# Patient Record
Sex: MALE | Race: WHITE | Employment: FULL TIME | URBAN - METROPOLITAN AREA
[De-identification: names, ages, dates, MRNs, and addresses within clinical notes are randomized per-mention and may not be internally consistent; named-entity substitution may affect disease eponyms.]

---

## 2022-01-12 ENCOUNTER — OFFICE VISIT (OUTPATIENT)
Dept: PODIATRY | Facility: CLINIC | Age: 27
End: 2022-01-12
Payer: COMMERCIAL

## 2022-01-12 ENCOUNTER — ANCILLARY PROCEDURE (OUTPATIENT)
Dept: GENERAL RADIOLOGY | Facility: CLINIC | Age: 27
End: 2022-01-12
Attending: PODIATRIST
Payer: COMMERCIAL

## 2022-01-12 VITALS
WEIGHT: 194 LBS | SYSTOLIC BLOOD PRESSURE: 155 MMHG | HEIGHT: 75 IN | BODY MASS INDEX: 24.12 KG/M2 | RESPIRATION RATE: 16 BRPM | HEART RATE: 101 BPM | DIASTOLIC BLOOD PRESSURE: 99 MMHG

## 2022-01-12 DIAGNOSIS — M25.371 ANKLE INSTABILITY, RIGHT: Primary | ICD-10-CM

## 2022-01-12 DIAGNOSIS — Q68.8 OS TRIGONUM SYNDROME: ICD-10-CM

## 2022-01-12 DIAGNOSIS — M25.871 IMPINGEMENT SYNDROME OF RIGHT ANKLE: ICD-10-CM

## 2022-01-12 DIAGNOSIS — M25.571 RIGHT ANKLE PAIN, UNSPECIFIED CHRONICITY: ICD-10-CM

## 2022-01-12 PROCEDURE — 99203 OFFICE O/P NEW LOW 30 MIN: CPT | Performed by: PODIATRIST

## 2022-01-12 PROCEDURE — 73610 X-RAY EXAM OF ANKLE: CPT | Mod: 26 | Performed by: RADIOLOGY

## 2022-01-12 ASSESSMENT — MIFFLIN-ST. JEOR: SCORE: 1935.61

## 2022-01-12 NOTE — PATIENT INSTRUCTIONS
"PATIENT INSTRUCTIONS    Physical Therapy  You have been referred to [facility].  Please call to schedule an appointment:  [phone].      Use ankle brace or compression sleeve when active.  If the ankle flares up, can perform a steroid (\"cortisone\") injection in the joint.    MRI if symptoms worsen    Activity to tolerance; don't have to avoid    NSAID - call for Rx    "

## 2022-01-12 NOTE — LETTER
1/12/2022         RE: Solitario Puckett  [address]  [place] [address]        Dear Colleague,    Thank you for referring your patient, Solitario Puckett, to the [facility]. Please see a copy of my visit note below.    Imaging:   x-ray  Weight-bearing views right ankle dated 1/12/21, reveal anterior tibiotalar spurring, os trigonum posteriorly, but no significant joint space narrowing.  Symmetric mortise without diastasis or tilt.  ST calcification (mild) w/in deltoid medially.  Entire foot on lateral was not captured as ordered but RF appears neutral to mildly pronated.      Assessment:      ICD-10-CM    1. Ankle instability, right  M25.371 XR Ankle Right G/E 3 Views   2. Impingement syndrome of right ankle  M25.871    3. Os trigonum syndrome  Q68.8           Plan:  Orders Placed This Encounter   Procedures     XR Ankle Right G/E 3 Views       Discussed the etiology and treatment of the condition with the patient.  Imaging studies reviewed and discussed with the patient.  Discussed surgical and conservative options.  Chronic ankle instability; symptoms in lateral gutter currently, likley some ST impingemen from chronic sprains.  Anterior osseous impingement present, only mild tenderness.  Pain at posterior ankle w/ FHL motion.  Several areas of potential pain but lateral ankle most symptomatic currently.    Discussed PT/brace vs MRI or injection.  PT elects for initial PT.  HE has a brace at home from prior sprains.    Discussed MRI or diagnostic/therapeutic ankle joint injection if needed.    -NSAID- call if diclofenac desired, pt declines today  -Brace- ASO.   -PT- referral  -Activity - to tolerance      Return:  Return if symptoms worsen or fail to improve, for R ankle instability.                Chief Complaint:     Patient presents with:  Consult For: Right ankle pain.     right ankle pain    HPI:  Solitario Puckett is a 26 year old year old male who presents for evaluation of ankle  "pain.      Prior ankle sprains x3 very badly on R; minor instability events on L.    Has also sprained L.  Points to lateral gutter, and posteromedial ankle as areas of max pain.  States he has brace at home he has used previously.  Stopped using because too uncomfortable.  Play basketball once weekly currently (rec).    Past Medical & Surgical History:  History reviewed. No pertinent past medical history.   History reviewed. No pertinent surgical history.   History reviewed. No pertinent family history.     Social History:  ?  History   Smoking Status     Not on file   Smokeless Tobacco     Not on file     History   Drug Use Not on file     Social History    Substance and Sexual Activity      Alcohol use: Not on file      Allergies:  ?   Not on File     Medications:    No current outpatient medications on file.     No current facility-administered medications for this visit.         Physical Exam:  ?  Vitals:  BP (!) 155/99   Pulse 101   Resp 16   Ht 1.905 m (6' 3\")   Wt 88 kg (194 lb)   BMI 24.25 kg/m     General:  WD/WN, in NAD.  A&O x3.  Dermatologic:    Skin is intact, open lesions absent.   Skin texture, turgor is normal.  Vascular:  Pulses palpable bilateral.  Digital capillary refill time normal bilateral.  Skin temperature is normal bilateral.  Generalized edema- none bilateral.  Focal edema- mild lateral gutter right.  Neurologic:    Gross sensation normal.  Gait and balance normal.  Musculoskeletal:  Maximal pain to palpation of lateral gutter right.  Mild pain to palpation medial gutter, right.  No pain to palpation of anterior joint space, peroneals, sinus tarsi, R  Ankle ROM full, osseous end point in DF, not painful, and PF, mildly painful.  Muscle strength FHL 5/5  prodcues posterior ankle pain, right.    Anterior drawer unstable, pain free, right.  Talar tilt incrased, but equal to Left, pain free, right  External rotation stress not painful, right      Stance:  RCSP neutral to valgus " bilateral.        Elma Biggs DPM            Again, thank you for allowing me to participate in the care of your patient.        Sincerely,        Elma Biggs DPM

## 2022-01-12 NOTE — PROGRESS NOTES
Imaging:   x-ray  Weight-bearing views right ankle dated 1/12/21, reveal anterior tibiotalar spurring, os trigonum posteriorly, but no significant joint space narrowing.  Symmetric mortise without diastasis or tilt.  ST calcification (mild) w/in deltoid medially.  Entire foot on lateral was not captured as ordered but RF appears neutral to mildly pronated.      Assessment:      ICD-10-CM    1. Ankle instability, right  M25.371 XR Ankle Right G/E 3 Views   2. Impingement syndrome of right ankle  M25.871    3. Os trigonum syndrome  Q68.8           Plan:  Orders Placed This Encounter   Procedures     XR Ankle Right G/E 3 Views       Discussed the etiology and treatment of the condition with the patient.  Imaging studies reviewed and discussed with the patient.  Discussed surgical and conservative options.  Chronic ankle instability; symptoms in lateral gutter currently, likley some ST impingemen from chronic sprains.  Anterior osseous impingement present, only mild tenderness.  Pain at posterior ankle w/ FHL motion.  Several areas of potential pain but lateral ankle most symptomatic currently.    Discussed PT/brace vs MRI or injection.  PT elects for initial PT.  HE has a brace at home from prior sprains.    Discussed MRI or diagnostic/therapeutic ankle joint injection if needed.    -NSAID- call if diclofenac desired, pt declines today  -Brace- ASO.   -PT- referral  -Activity - to tolerance      Return:  Return if symptoms worsen or fail to improve, for R ankle instability.                Chief Complaint:     Patient presents with:  Consult For: Right ankle pain.     right ankle pain    HPI:  Solitario Puckett is a 26 year old year old male who presents for evaluation of ankle pain.      Prior ankle sprains x3 very badly on R; minor instability events on L.    Has also sprained L.  Points to lateral gutter, and posteromedial ankle as areas of max pain.  States he has brace at home he has used previously.  Stopped using  "because too uncomfortable.  Play basketball once weekly currently (rec).    Past Medical & Surgical History:  History reviewed. No pertinent past medical history.   History reviewed. No pertinent surgical history.   History reviewed. No pertinent family history.     Social History:  ?  History   Smoking Status     Not on file   Smokeless Tobacco     Not on file     History   Drug Use Not on file     Social History    Substance and Sexual Activity      Alcohol use: Not on file      Allergies:  ?   Not on File     Medications:    No current outpatient medications on file.     No current facility-administered medications for this visit.         Physical Exam:  ?  Vitals:  BP (!) 155/99   Pulse 101   Resp 16   Ht 1.905 m (6' 3\")   Wt 88 kg (194 lb)   BMI 24.25 kg/m     General:  WD/WN, in NAD.  A&O x3.  Dermatologic:    Skin is intact, open lesions absent.   Skin texture, turgor is normal.  Vascular:  Pulses palpable bilateral.  Digital capillary refill time normal bilateral.  Skin temperature is normal bilateral.  Generalized edema- none bilateral.  Focal edema- mild lateral gutter right.  Neurologic:    Gross sensation normal.  Gait and balance normal.  Musculoskeletal:  Maximal pain to palpation of lateral gutter right.  Mild pain to palpation medial gutter, right.  No pain to palpation of anterior joint space, peroneals, sinus tarsi, R  Ankle ROM full, osseous end point in DF, not painful, and PF, mildly painful.  Muscle strength FHL 5/5  prodcues posterior ankle pain, right.    Anterior drawer unstable, pain free, right.  Talar tilt incrased, but equal to Left, pain free, right  External rotation stress not painful, right      Stance:  RCSP neutral to valgus bilateral.        Elma Biggs DPM        "

## 2022-01-17 ENCOUNTER — THERAPY VISIT (OUTPATIENT)
Dept: PHYSICAL THERAPY | Facility: CLINIC | Age: 27
End: 2022-01-17
Payer: COMMERCIAL

## 2022-01-17 DIAGNOSIS — M25.871 IMPINGEMENT SYNDROME OF RIGHT ANKLE: ICD-10-CM

## 2022-01-17 DIAGNOSIS — M25.371 ANKLE INSTABILITY, RIGHT: ICD-10-CM

## 2022-01-17 DIAGNOSIS — Q68.8 OS TRIGONUM SYNDROME: ICD-10-CM

## 2022-01-17 PROCEDURE — 97112 NEUROMUSCULAR REEDUCATION: CPT | Mod: GP | Performed by: PHYSICAL THERAPIST

## 2022-01-17 PROCEDURE — 97161 PT EVAL LOW COMPLEX 20 MIN: CPT | Mod: GP | Performed by: PHYSICAL THERAPIST

## 2022-01-17 PROCEDURE — 97110 THERAPEUTIC EXERCISES: CPT | Mod: GP | Performed by: PHYSICAL THERAPIST

## 2022-01-17 ASSESSMENT — ACTIVITIES OF DAILY LIVING (ADL)
HIGHEST_POTENTIAL_ADL_SCORE:: 84
TOTAL_ADL_ITEM_SCORE:: 60
ACTIVITIES_OF_DAILY_LIVING_MEASURE_SCORE(%):: 71.43

## 2022-01-17 NOTE — PROGRESS NOTES
Physical Therapy Initial Evaluation  Subjective:  The history is provided by the patient.   Patient Health History  Solitario Puckett being seen for ankle pain, instability due to old injury .     Problem began: 1/1/2014.   Problem occurred: basketball sprains    Pain is reported as 4/10 (brief 8/10 ) on pain scale.  General health as reported by patient is good.  Pertinent medical history includes: asthma.   Red flags:  None as reported by patient.         Current medications:  None.    Current occupation is  .   Primary job tasks include:  Prolonged sitting and computer work.                  Therapist Generated HPI Evaluation  Problem details: Pt reports several sights of pain in his Rt ankle, w/ episodes of giving out/locking up, swelling and instability. Xrays reveal impingement of hte peroneals, spurring and instab.  He is a regular  but has stopped due to pain. He admits spraining his ankle, both, several times but has done no rehab/PT.      .         Type of problem:  Right ankle.    This is a chronic condition.  Condition occurred with:  A fall/slip, a twist, a wrong landing and a jump.  Where condition occurred: during recreation/sport.  Patient reports pain:  Anterior, lateral, joint and posterior.  Pain is described as aching, stabbing and sharp and is intermittent.  Pain radiates to:  No radiation. Pain is the same all the time.  Since onset symptoms are gradually worsening.  Associated symptoms:  Buckling/giving out, edema, loss of motion/stiffness, loss of strength and catching. Symptoms are exacerbated by running, bending/squatting and walking  and relieved by nothing.  Special tests included:  X-ray.    Restrictions due to condition include:  Working in normal job without restrictions.  Barriers include:  None as reported by patient.                        Objective:  System    Ankle/Foot Evaluation  ROM:    AROM:    Dorsiflexion: Left:    Right:   7  Plantarflexion: Left:      Right:  Norm  Inversion: Left:      Right:  +45 hyper   Eversion:     Right:  +30       PROM:    Dorsiflexion: Left:        Right:   8   Plantarflexion: Left:        Right:  N  Inversion: Left:          Right:   50  Eversion: Left:      Right:  32      Pain: ++TTP peroneals, Tib/fib ant and post     Strength:    Dorsiflexion:  Right: 4+/5   Pain:  Plantarflexion: Right: 5/5  Pain:  Inversion:Right: 4/5  Pain:  Eversion:Right: 5-/5  Pain:      Anterior Tibialis:Right: 4/5  Pain:  Posterior Tibialis: Right: 4/5  Pain:  Peroneals: Right: 4+/5  Pain:        LIGAMENT TESTING:   Anterior Drawer (ATF) Right: Gr I  Posterior Drawer (PTF) Right: Gr I  Varus Stress (Calc Fib) Right: Gr I  Valgus Stress (Deltoid) Right: Gr II    External Rotation (High Ankle) Right: Gr I  SPECIAL TESTS: not assessed    PALPATION:     Right ankle tenderness present at:   anterior talofibular ligament; posterior talofibular ligament; medial malleolus; lateral malleolus; anterior tibiofibular ligament and posterior tibiofibular ligament  EDEMA: normal          MOBILITY TESTING:     Tib-Fib Distal Right: hypermobile  Talocrural Right: hypermobile  Subtalar Right: normal  Midtarsal Right: normal  First Ray Right: normal  FUNCTIONAL TESTS: Functional test ankle: +TF instab, better when wrapped                                                               General     ROS    Assessment/Plan:    Patient is a 26 year old male with right side ankle complaints.    Patient has the following significant findings with corresponding treatment plan.                Diagnosis 1:  Rt ankle instabilty  Pain -  hot/cold therapy, manual therapy, splint/taping/bracing/orthotics, self management and home program  Decreased ROM/flexibility - manual therapy, therapeutic exercise and home program  Decreased strength - therapeutic exercise and therapeutic activities  Impaired balance - neuro re-education and therapeutic activities  Decreased proprioception - neuro  re-education and therapeutic activities  Inflammation - cold therapy and self management/home program  Impaired gait - gait training and home program  Impaired muscle performance - neuro re-education  Decreased function - therapeutic activities  Instability -  Therapeutic Activity  Therapeutic Exercise    Therapy Evaluation Codes:   1) History comprised of:   Personal factors that impact the plan of care:      Coping style, Overall behavior pattern and Past/current experiences.    Comorbidity factors that impact the plan of care are:      Asthma.     Medications impacting care: None.  2) Examination of Body Systems comprised of:   Body structures and functions that impact the plan of care:      Ankle.   Activity limitations that impact the plan of care are:      Dressing, Jumping, Running, Sports, Stairs and Walking.  3) Clinical presentation characteristics are:   Stable/Uncomplicated.  4) Decision-Making    Low complexity using standardized patient assessment instrument and/or measureable assessment of functional outcome.  Cumulative Therapy Evaluation is: Low complexity.    Previous and current functional limitations:  (See Goal Flow Sheet for this information)    Short term and Long term goals: (See Goal Flow Sheet for this information)     Communication ability:  Patient appears to be able to clearly communicate and understand verbal and written communication and follow directions correctly.  Treatment Explanation - The following has been discussed with the patient:   RX ordered/plan of care  Anticipated outcomes  Possible risks and side effects  This patient would benefit from PT intervention to resume normal activities.   Rehab potential is good.    Frequency:  1 X week, once daily tapering as able   Duration:  for 8 weeks  Discharge Plan:  Achieve all LTG.  Independent in home treatment program.  Reach maximal therapeutic benefit.    Please refer to the daily flowsheet for treatment today, total treatment  time and time spent performing 1:1 timed codes.

## 2022-01-24 ENCOUNTER — THERAPY VISIT (OUTPATIENT)
Dept: PHYSICAL THERAPY | Facility: CLINIC | Age: 27
End: 2022-01-24
Payer: COMMERCIAL

## 2022-01-24 DIAGNOSIS — M25.371 RIGHT ANKLE INSTABILITY: ICD-10-CM

## 2022-01-24 PROCEDURE — 97110 THERAPEUTIC EXERCISES: CPT | Mod: GP | Performed by: PHYSICAL THERAPIST

## 2022-01-24 PROCEDURE — 97112 NEUROMUSCULAR REEDUCATION: CPT | Mod: GP | Performed by: PHYSICAL THERAPIST

## 2022-02-15 ENCOUNTER — THERAPY VISIT (OUTPATIENT)
Dept: PHYSICAL THERAPY | Facility: CLINIC | Age: 27
End: 2022-02-15
Payer: COMMERCIAL

## 2022-02-15 DIAGNOSIS — M25.371 RIGHT ANKLE INSTABILITY: ICD-10-CM

## 2022-02-15 PROCEDURE — 97110 THERAPEUTIC EXERCISES: CPT | Mod: GP | Performed by: PHYSICAL THERAPIST

## 2022-02-16 ASSESSMENT — ACTIVITIES OF DAILY LIVING (ADL)
HIGHEST_POTENTIAL_ADL_SCORE:: 84
TOTAL_ADL_ITEM_SCORE:: 78
ACTIVITIES_OF_DAILY_LIVING_MEASURE_SCORE(%):: 92.86

## 2022-04-26 ENCOUNTER — OFFICE VISIT (OUTPATIENT)
Dept: FAMILY MEDICINE | Facility: CLINIC | Age: 27
End: 2022-04-26
Payer: COMMERCIAL

## 2022-04-26 VITALS
OXYGEN SATURATION: 97 % | HEIGHT: 76 IN | DIASTOLIC BLOOD PRESSURE: 80 MMHG | BODY MASS INDEX: 23.48 KG/M2 | HEART RATE: 77 BPM | RESPIRATION RATE: 16 BRPM | WEIGHT: 192.8 LBS | SYSTOLIC BLOOD PRESSURE: 127 MMHG

## 2022-04-26 DIAGNOSIS — J45.990 EXERCISE-INDUCED ASTHMA: ICD-10-CM

## 2022-04-26 DIAGNOSIS — J45.30 MILD PERSISTENT ASTHMA WITHOUT COMPLICATION: Primary | ICD-10-CM

## 2022-04-26 DIAGNOSIS — J30.2 SEASONAL ALLERGIC RHINITIS, UNSPECIFIED TRIGGER: ICD-10-CM

## 2022-04-26 PROCEDURE — 99203 OFFICE O/P NEW LOW 30 MIN: CPT | Performed by: FAMILY MEDICINE

## 2022-04-26 RX ORDER — FLUTICASONE PROPIONATE 50 MCG
1 SPRAY, SUSPENSION (ML) NASAL DAILY
COMMUNITY
End: 2022-04-26

## 2022-04-26 RX ORDER — MONTELUKAST SODIUM 10 MG/1
10 TABLET ORAL AT BEDTIME
COMMUNITY
End: 2022-04-26

## 2022-04-26 RX ORDER — ALBUTEROL SULFATE 90 UG/1
2 AEROSOL, METERED RESPIRATORY (INHALATION) EVERY 6 HOURS
COMMUNITY
End: 2022-04-26

## 2022-04-26 RX ORDER — FLUTICASONE PROPIONATE 50 MCG
1 SPRAY, SUSPENSION (ML) NASAL DAILY
Qty: 19 G | Refills: 3 | Status: SHIPPED | OUTPATIENT
Start: 2022-04-26 | End: 2022-06-21

## 2022-04-26 RX ORDER — BUDESONIDE AND FORMOTEROL FUMARATE DIHYDRATE 160; 4.5 UG/1; UG/1
2 AEROSOL RESPIRATORY (INHALATION) 2 TIMES DAILY
COMMUNITY

## 2022-04-26 RX ORDER — BUDESONIDE AND FORMOTEROL FUMARATE DIHYDRATE 160; 4.5 UG/1; UG/1
2 AEROSOL RESPIRATORY (INHALATION) 2 TIMES DAILY
Qty: 30.6 G | Refills: 1 | Status: SHIPPED | OUTPATIENT
Start: 2022-04-26 | End: 2022-06-28

## 2022-04-26 RX ORDER — MONTELUKAST SODIUM 10 MG/1
10 TABLET ORAL AT BEDTIME
Qty: 90 TABLET | Refills: 1 | Status: SHIPPED | OUTPATIENT
Start: 2022-04-26 | End: 2022-10-24

## 2022-04-26 RX ORDER — ALBUTEROL SULFATE 90 UG/1
2 AEROSOL, METERED RESPIRATORY (INHALATION) EVERY 6 HOURS PRN
Qty: 25.5 G | Refills: 1 | Status: SHIPPED | OUTPATIENT
Start: 2022-04-26

## 2022-04-26 ASSESSMENT — PAIN SCALES - GENERAL: PAINLEVEL: NO PAIN (0)

## 2022-04-26 ASSESSMENT — ASTHMA QUESTIONNAIRES: ACT_TOTALSCORE: 22

## 2022-04-26 NOTE — PROGRESS NOTES
Assessment & Plan     Mild persistent asthma without complication   Well controlled. ACT score 22. Refill Singulaitr and albuterol  - montelukast (SINGULAIR) 10 MG tablet; Take 1 tablet (10 mg) by mouth At Bedtime  - albuterol (PROAIR HFA/PROVENTIL HFA/VENTOLIN HFA) 108 (90 Base) MCG/ACT inhaler; Inhale 2 puffs into the lungs every 6 hours as needed for shortness of breath / dyspnea or wheezing  - budesonide-formoterol (SYMBICORT) 160-4.5 MCG/ACT Inhaler; Inhale 2 puffs into the lungs 2 times daily    Exercise-induced asthma   Use rescue inhaler prior to exercise  - albuterol (PROAIR HFA/PROVENTIL HFA/VENTOLIN HFA) 108 (90 Base) MCG/ACT inhaler; Inhale 2 puffs into the lungs every 6 hours as needed for shortness of breath / dyspnea or wheezing  - budesonide-formoterol (SYMBICORT) 160-4.5 MCG/ACT Inhaler; Inhale 2 puffs into the lungs 2 times daily    Seasonal allergic rhinitis, unspecified trigger  - fluticasone (FLONASE) 50 MCG/ACT nasal spray; Spray 1 spray into both nostrils daily  - montelukast (SINGULAIR) 10 MG tablet; Take 1 tablet (10 mg) by mouth At Bedtime    Return in about 3 months (around 7/26/2022) for Routine Visit.    Dm Allen MD  [facility] DAVID Jerez is a 26 year old who presents for the following health issues     History of Present Illness     Asthma:  He presents for follow up of asthma.  He has no cough, no wheezing, and no shortness of breath. He is using a relief medication a few times a week. He does not miss any doses of his controller medication throughout the week.Patient is aware of the following triggers: animal dander, cold air, dust mites, exercise or sports, humidity, mold, pollens, smoke and strong odors and fumes. The patient has not had a visit to the Emergency Room, Urgent Care or Hospital due to asthma since the last clinic visit.     He eats 2-3 servings of fruits and vegetables daily.He consumes 1 sweetened beverage(s) daily.He  "exercises with enough effort to increase his heart rate 20 to 29 minutes per day.  He exercises with enough effort to increase his heart rate 4 days per week.   He is taking medications regularly.     Always had asthma medication.  Been taking{ Singulair, Symbicort, flunase, ventolin inhaler    Asthma Follow-Up    Was ACT completed today?    Yes    ACT Total Scores 4/26/2022   ACT TOTAL SCORE (Goal Greater than or Equal to 20) 22   In the past 12 months, how many times did you visit the emergency room for your asthma without being admitted to the hospital? 0   In the past 12 months, how many times were you hospitalized overnight because of your asthma? 0          How many days per week do you miss taking your asthma controller medication?  0    Please describe any recent triggers for your asthma: smoke, dust mites, pollens, insects/rodents, mold, humidity, strong odors and fumes, exercise or sports and cold air    Have you had any Emergency Room Visits, Urgent Care Visits, or Hospital Admissions since your last office visit?  No      Review of Systems   Constitutional, HEENT, cardiovascular, pulmonary, gi and gu systems are negative, except as otherwise noted.      Objective    /80 (BP Location: Right arm)   Pulse 77   Resp 16   Ht 1.935 m (6' 4.18\")   Wt 87.5 kg (192 lb 12.8 oz)   SpO2 97%   BMI 23.36 kg/m    Body mass index is 23.36 kg/m .  Physical Exam   GENERAL: healthy, alert and no distress  RESP: lungs clear to auscultation - no rales, rhonchi or wheezes  CV: regular rate and rhythm, no murmur, click or rub, no peripheral edema  MS: no gross musculoskeletal defects noted, no edema    "

## 2022-04-26 NOTE — LETTER
My Asthma Action Plan    Name: Solitario Puckett   YOB: 1995  Date: 4/26/2022   My doctor: Dm Allen MD   My clinic: [facility]        My Control Medicine: Budesonide + formoterol (Symbicort HFA) -  160/4.5 mcg once daily  My Rescue Medicine: Albuterol (Proair/Ventolin/Proventil HFA) 2-4 puffs EVERY 4 HOURS as needed. Use a spacer if recommended by your provider.  My Oral Steroid Medicine: N/A My Asthma Severity:   Mild Persistent  Know your asthma triggers: smoke, dust mites, pollens, animal dander, mold, humidity, strong odors and fumes, exercise or sports and cold air               GREEN ZONE   Good Control    I feel good    No cough or wheeze    Can work, sleep and play without asthma symptoms       Take your asthma control medicine every day.     1. If exercise triggers your asthma, take your rescue medication    15 minutes before exercise or sports, and    During exercise if you have asthma symptoms  2. Spacer to use with inhaler: If you have a spacer, make sure to use it with your inhaler             YELLOW ZONE Getting Worse  I have ANY of these:    I do not feel good    Cough or wheeze    Chest feels tight    Wake up at night   1. Keep taking your Green Zone medications  2. Start taking your rescue medicine:    every 20 minutes for up to 1 hour. Then every 4 hours for 24-48 hours.  3. If you stay in the Yellow Zone for more than 12-24 hours, contact your doctor.  4. If you do not return to the Green Zone in 12-24 hours or you get worse, start taking your oral steroid medicine if prescribed by your provider.           RED ZONE Medical Alert - Get Help  I have ANY of these:    I feel awful    Medicine is not helping    Breathing getting harder    Trouble walking or talking    Nose opens wide to breathe       1. Take your rescue medicine NOW  2. If your provider has prescribed an oral steroid medicine, start taking it NOW  3. Call your doctor NOW  4. If you are  still in the Red Zone after 20 minutes and you have not reached your doctor:    Take your rescue medicine again and    Call 911 or go to the emergency room right away    See your regular doctor within 2 weeks of an Emergency Room or Urgent Care visit for follow-up treatment.          Annual Reminders:  Meet with Asthma Educator,  Flu Shot in the Fall, consider Pneumonia Vaccination for patients with asthma (aged 19 and older).    Pharmacy: [id number] [place] [id number] [facility]    Electronically signed by Dm Allen MD   Date: 04/26/22                      Asthma Triggers  How To Control Things That Make Your Asthma Worse    Triggers are things that make your asthma worse.  Look at the list below to help you find your triggers and what you can do about them.  You can help prevent asthma flare-ups by staying away from your triggers.      Trigger                                                          What you can do   Cigarette Smoke  Tobacco smoke can make asthma worse. Do not allow smoking in your home, car or around you.  Be sure no one smokes at a child s day care or school.  If you smoke, ask your health care provider for ways to help you quit.  Ask family members to quit too.  Ask your health care provider for a referral to Quit Plan to help you quit smoking, or call 8-094-963-PLAN.     Colds, Flu, Bronchitis  These are common triggers of asthma. Wash your hands often.  Don t touch your eyes, nose or mouth.  Get a flu shot every year.     Dust Mites  These are tiny bugs that live in cloth or carpet. They are too small to see. Wash sheets and blankets in hot water every week.   Encase pillows and mattress in dust mite proof covers.  Avoid having carpet if you can. If you have carpet, vacuum weekly.   Use a dust mask and HEPA vacuum.   Pollen and Outdoor Mold  Some people are allergic to trees, grass, or weed pollen, or molds. Try to keep your windows closed.  Limit time out doors  when pollen count is high.   Ask you health care provider about taking medicine during allergy season.     Animal Dander  Some people are allergic to skin flakes, urine or saliva from pets with fur or feathers. Keep pets with fur or feathers out of your home.    If you can t keep the pet outdoors, then keep the pet out of your bedroom.  Keep the bedroom door closed.  Keep pets off cloth furniture and away from stuffed toys.     Mice, Rats, and Cockroaches   Some people are allergic to the waste from these pests.   Cover food and garbage.  Clean up spills and food crumbs.  Store grease in the refrigerator.   Keep food out of the bedroom.   Indoor Mold  This can be a trigger if your home has high moisture. Fix leaking faucets, pipes, or other sources of water.   Clean moldy surfaces.  Dehumidify basement if it is damp and smelly.   Smoke, Strong Odors, and Sprays  These can reduce air quality. Stay away from strong odors and sprays, such as perfume, powder, hair spray, paints, smoke incense, paint, cleaning products, candles and new carpet.   Exercise or Sports  Some people with asthma have this trigger. Be active!  Ask your doctor about taking medicine before sports or exercise to prevent symptoms.    Warm up for 5-10 minutes before and after sports or exercise.     Other Triggers of Asthma  Cold air:  Cover your nose and mouth with a scarf.  Sometimes laughing or crying can be a trigger.  Some medicines and food can trigger asthma.

## 2022-05-21 DIAGNOSIS — J30.2 SEASONAL ALLERGIC RHINITIS, UNSPECIFIED TRIGGER: ICD-10-CM

## 2022-05-22 RX ORDER — FLUTICASONE PROPIONATE 50 MCG
SPRAY, SUSPENSION (ML) NASAL
Qty: 16 ML | Refills: 3 | OUTPATIENT
Start: 2022-05-22

## 2022-05-23 NOTE — TELEPHONE ENCOUNTER
Duplicate request for Fluticasone (Flonase), prescription was sent on 4/26/22 with refills.   Angela Aguilar RN

## 2022-06-20 DIAGNOSIS — J30.2 SEASONAL ALLERGIC RHINITIS, UNSPECIFIED TRIGGER: ICD-10-CM

## 2022-06-21 RX ORDER — FLUTICASONE PROPIONATE 50 MCG
SPRAY, SUSPENSION (ML) NASAL
Qty: 16 ML | Refills: 1 | Status: SHIPPED | OUTPATIENT
Start: 2022-06-21

## 2022-06-26 DIAGNOSIS — J45.990 EXERCISE-INDUCED ASTHMA: ICD-10-CM

## 2022-06-26 DIAGNOSIS — J45.30 MILD PERSISTENT ASTHMA WITHOUT COMPLICATION: ICD-10-CM

## 2022-06-28 RX ORDER — BUDESONIDE AND FORMOTEROL FUMARATE DIHYDRATE 160; 4.5 UG/1; UG/1
AEROSOL RESPIRATORY (INHALATION)
Qty: 30.6 G | Refills: 0 | Status: SHIPPED | OUTPATIENT
Start: 2022-06-28 | End: 2022-09-28

## 2022-07-25 PROBLEM — M25.371 RIGHT ANKLE INSTABILITY: Status: RESOLVED | Noted: 2022-01-24 | Resolved: 2022-07-25

## 2022-09-27 DIAGNOSIS — J45.30 MILD PERSISTENT ASTHMA WITHOUT COMPLICATION: ICD-10-CM

## 2022-09-27 DIAGNOSIS — J45.990 EXERCISE-INDUCED ASTHMA: ICD-10-CM

## 2022-09-28 RX ORDER — BUDESONIDE AND FORMOTEROL FUMARATE DIHYDRATE 160; 4.5 UG/1; UG/1
2 AEROSOL RESPIRATORY (INHALATION) 2 TIMES DAILY
Qty: 30 G | Refills: 0 | Status: SHIPPED | OUTPATIENT
Start: 2022-09-28 | End: 2023-01-02

## 2022-10-22 DIAGNOSIS — J45.30 MILD PERSISTENT ASTHMA WITHOUT COMPLICATION: ICD-10-CM

## 2022-10-22 DIAGNOSIS — J30.2 SEASONAL ALLERGIC RHINITIS, UNSPECIFIED TRIGGER: ICD-10-CM

## 2022-10-24 RX ORDER — MONTELUKAST SODIUM 10 MG/1
TABLET ORAL
Qty: 90 TABLET | Refills: 1 | Status: SHIPPED | OUTPATIENT
Start: 2022-10-24

## 2022-12-31 DIAGNOSIS — J45.30 MILD PERSISTENT ASTHMA WITHOUT COMPLICATION: ICD-10-CM

## 2022-12-31 DIAGNOSIS — J45.990 EXERCISE-INDUCED ASTHMA: ICD-10-CM

## 2023-01-02 RX ORDER — BUDESONIDE AND FORMOTEROL FUMARATE DIHYDRATE 160; 4.5 UG/1; UG/1
AEROSOL RESPIRATORY (INHALATION)
Qty: 10.2 G | Refills: 1 | Status: SHIPPED | OUTPATIENT
Start: 2023-01-02

## 2023-01-02 NOTE — TELEPHONE ENCOUNTER
ACT Total Scores 4/26/2022   ACT TOTAL SCORE (Goal Greater than or Equal to 20) 22   In the past 12 months, how many times did you visit the emergency room for your asthma without being admitted to the hospital? 0   In the past 12 months, how many times were you hospitalized overnight because of your asthma? 0